# Patient Record
Sex: FEMALE | Race: WHITE | URBAN - METROPOLITAN AREA
[De-identification: names, ages, dates, MRNs, and addresses within clinical notes are randomized per-mention and may not be internally consistent; named-entity substitution may affect disease eponyms.]

---

## 2022-08-25 ENCOUNTER — OFFICE VISIT (OUTPATIENT)
Dept: ORTHOPEDIC SURGERY | Age: 47
End: 2022-08-25
Payer: COMMERCIAL

## 2022-08-25 VITALS — WEIGHT: 134 LBS | BODY MASS INDEX: 22.88 KG/M2 | HEIGHT: 64 IN

## 2022-08-25 DIAGNOSIS — M54.42 CHRONIC LEFT-SIDED LOW BACK PAIN WITH LEFT-SIDED SCIATICA: Primary | ICD-10-CM

## 2022-08-25 DIAGNOSIS — G89.29 CHRONIC LEFT-SIDED LOW BACK PAIN WITH LEFT-SIDED SCIATICA: Primary | ICD-10-CM

## 2022-08-25 DIAGNOSIS — M43.16 SPONDYLOLISTHESIS AT L4-L5 LEVEL: ICD-10-CM

## 2022-08-25 PROCEDURE — 99204 OFFICE O/P NEW MOD 45 MIN: CPT | Performed by: STUDENT IN AN ORGANIZED HEALTH CARE EDUCATION/TRAINING PROGRAM

## 2022-08-25 RX ORDER — PREDNISONE 20 MG/1
TABLET ORAL
COMMUNITY
Start: 2022-08-23

## 2022-08-25 RX ORDER — ZINC GLUCONATE 10 MG
250 LOZENGE ORAL
COMMUNITY

## 2022-08-25 RX ORDER — METHOCARBAMOL 500 MG/1
TABLET, FILM COATED ORAL
COMMUNITY
Start: 2022-08-23

## 2022-08-25 RX ORDER — NORETHINDRONE ACETATE AND ETHINYL ESTRADIOL 1MG-20(21)
KIT ORAL
COMMUNITY

## 2022-08-25 RX ORDER — OMEGA-3S/DHA/EPA/FISH OIL 300-1000MG
CAPSULE ORAL
COMMUNITY

## 2022-08-25 RX ORDER — HYDROCHLOROTHIAZIDE 25 MG/1
TABLET ORAL
COMMUNITY

## 2022-08-25 RX ORDER — BACLOFEN 20 MG
TABLET ORAL
COMMUNITY

## 2022-08-25 NOTE — PROGRESS NOTES
Leo Hanson (: 1975) is a 55 y.o. female here for evaluation of the following chief complaint(s):  Back Pain and Leg Pain       ASSESSMENT/PLAN:  Below is the assessment and plan developed based on review of pertinent history, physical exam, labs, studies, and medications. 1. Chronic left-sided low back pain with left-sided sciatica  -     XR SPINE LUMB MIN 4 V; Future  2. Spondylolisthesis at L4-L5 level      Return in about 2 months (around 10/25/2022). Continue physical therapy and complete steroid Dosepak. Okay to take over-the-counter Aleve 2 tablets twice a day for 2 weeks if needed down the line. I would like to see Teo preciado in 2 to 3 months for routine evaluation. Red flag symptoms discussed with the patient. Patient is to present to the emergency department if any of these symptoms occur. Patient verbalized understanding and agrees to proceed with the aforementioned plan. Thank you for allowing me to participate in the care of this patient. SUBJECTIVE/OBJECTIVE:    HPI    Patient is a pleasant 26-year-old female with a background back pain mild in nature and 2 to 3-week history of left leg pain and paresthesias. Her pain onset after a prolonged flight to [facility]. She is able to do lots of walking and standing with minimal discomfort but every time she sat down her pain worsened. She has a physical therapist friend and has been doing PT at home. She also went to urgent care and was prescribed steroid Dosepak and muscle relaxants with relief. She is approximately 80% better today and continues to improve. She would like to stay the course. She is wanting to know if there is anything that she should or should not be doing to further damage her back. She is otherwise healthy and has no red flag symptoms.     Therapies (Rx/PT/INJ): Physical therapy, steroids, muscle relaxants  Prior Spine Sx: None    Chief Complaint   Patient presents with    Back Pain    Leg Pain Current Outpatient Medications   Medication Sig    norethindrone-ethinyl estradiol (JUNEL FE 1/20) 1 mg-20 mcg (21)/75 mg (7) tab Junel FE 1/20 (28) 1 mg-20 mcg (21)/75 mg (7) tablet   Take 1 tablet every day by oral route as directed for 84 days. magnesium 250 mg tab 250 mg.    omega-3s-dha-epa-fish oil (Omega-3 Fish OiL) 300-1,000 mg cap Omega 3 Fish Oil    magnesium oxide 500 mg tab     methocarbamoL (ROBAXIN) 500 mg tablet     hydroCHLOROthiazide (HYDRODIURIL) 25 mg tablet hydrochlorothiazide 25 mg tablet    predniSONE (DELTASONE) 20 mg tablet      No current facility-administered medications for this visit. History reviewed. No pertinent past medical history. History reviewed. No pertinent surgical history. History reviewed. No pertinent family history.   Social History     Tobacco Use    Smoking status: Never     Passive exposure: Never    Smokeless tobacco: Never   Substance Use Topics    Alcohol use: Not Currently    Drug use: Never      Social History     Tobacco Use   Smoking Status Never    Passive exposure: Never   Smokeless Tobacco Never     Social History     Substance and Sexual Activity   Alcohol Use Not Currently       Review of Systems  Red flag symptoms: No  Bowel/Bladder/Saddle Anesthesia: Denies  Weakness/Sensory Disturbance: No weakness, left lower extremity paresthesias  Ambulation/Falls: Ambulates without assist, no significant fall history    Ht 5' 4\" (1.626 m)   Wt 134 lb (60.8 kg)   BMI 23.00 kg/m²      Physical Exam    GENERAL:  AAOx3, appears stated age, no distress  Body habitus: Normal    LOWER EXTREMITIES:  Gait: Intact heel toe and tandem gait   Motor: 5/5 in all myotomes L3-S1 bilaterally  Sensory: Intact to light touch in all dermatomes L4-S1 bilaterally  Reflexes: Normal L4 and S1 bilaterally  Pathological reflexes: No sustained clonus, downgoing Babinski bilaterally   Special tests: Negative seated SLR bilaterally      IMAGING:    XR Results (most recent):  Results from Appointment encounter on 08/25/22    XR SPINE LUMB MIN 4 V    Narrative  4 view lumbar spine including flexion-extension with grade 2 anterolisthesis at L4-L5 with instability on dynamic films. Query micromotion at L5-S1 on dynamic films. Associated posterior facet arthrosis. No significant coronal deformity. An electronic signature was used to authenticate this note.   -- Segundo Clemons, DO

## 2022-08-25 NOTE — PROGRESS NOTES
1. Have you been to the ER, urgent care clinic since your last visit? Hospitalized since your last visit? No    2. Have you seen or consulted any other health care providers outside of the [address] [place] [address] since your last visit? Include any pap smears or colon screening.  No    Chief Complaint   Patient presents with    Back Pain    Leg Pain

## 2023-01-03 ENCOUNTER — OFFICE VISIT (OUTPATIENT)
Dept: ORTHOPEDIC SURGERY | Age: 48
End: 2023-01-03
Payer: COMMERCIAL

## 2023-01-03 VITALS — WEIGHT: 134 LBS | HEIGHT: 64 IN | BODY MASS INDEX: 22.88 KG/M2

## 2023-01-03 DIAGNOSIS — G89.29 CHRONIC LEFT-SIDED LOW BACK PAIN WITH LEFT-SIDED SCIATICA: Primary | ICD-10-CM

## 2023-01-03 DIAGNOSIS — M43.16 SPONDYLOLISTHESIS AT L4-L5 LEVEL: ICD-10-CM

## 2023-01-03 DIAGNOSIS — M54.42 CHRONIC LEFT-SIDED LOW BACK PAIN WITH LEFT-SIDED SCIATICA: Primary | ICD-10-CM

## 2023-01-03 PROCEDURE — 99213 OFFICE O/P EST LOW 20 MIN: CPT | Performed by: STUDENT IN AN ORGANIZED HEALTH CARE EDUCATION/TRAINING PROGRAM

## 2023-01-03 RX ORDER — NORETHINDRONE ACETATE AND ETHINYL ESTRADIOL 1; .02 MG/1; MG/1
TABLET ORAL
COMMUNITY

## 2023-01-03 RX ORDER — GLUCOSAM/CHONDRO/HERB 149/HYAL 750-100 MG
TABLET ORAL
COMMUNITY

## 2023-01-03 RX ORDER — MELOXICAM 15 MG/1
15 TABLET ORAL DAILY
Qty: 30 TABLET | Refills: 1 | Status: SHIPPED | OUTPATIENT
Start: 2023-01-03

## 2023-01-03 RX ORDER — GLUCOSAMINE SULFATE 1500 MG
POWDER IN PACKET (EA) ORAL
COMMUNITY

## 2023-01-03 RX ORDER — EZETIMIBE 10 MG/1
TABLET ORAL
COMMUNITY

## 2023-01-03 NOTE — PROGRESS NOTES
1. Have you been to the ER, urgent care clinic since your last visit? Hospitalized since your last visit? No    2. Have you seen or consulted any other health care providers outside of the [address] [place] [address] since your last visit? Include any pap smears or colon screening. No  Chief Complaint   Patient presents with    Back Pain    Follow-up     Did not go to PT. Exercising at home.
Leo Hanson (: 1975) is a 52 y.o. female here for evaluation of the following chief complaint(s):  Back Pain and Follow-up (Did not go to PT. Exercising at home.)       ASSESSMENT/PLAN:  Below is the assessment and plan developed based on review of pertinent history, physical exam, labs, studies, and medications. 1. Chronic left-sided low back pain with left-sided sciatica  2. Spondylolisthesis at L4-L5 level    Return in about 1 year (around 1/3/2024). SUBJECTIVE/OBJECTIVE:  HPI  Patient is well-known to the spine service. She is a pleasant 51-year-old female with intermittent left lower extremity sciatica. At this point time she is doing well has no pain. When she does little too much at work around the house she does feel pain for a few days and then itself resolves. No new red flag symptoms. Review of Systems  See above HPI and prior clinic notes for full ROS     Ht 5' 4\" (1.626 m)   Wt 134 lb (60.8 kg)   BMI 23.00 kg/m²    Physical Exam  No interval change in PE, grossly motor/sensory intact, no new neurological deficits    IMAGING:  No new imaging obtained today. An electronic signature was used to authenticate this note.   -- Mak Kam, 
None